# Patient Record
Sex: MALE | Race: WHITE
[De-identification: names, ages, dates, MRNs, and addresses within clinical notes are randomized per-mention and may not be internally consistent; named-entity substitution may affect disease eponyms.]

---

## 2017-08-25 ENCOUNTER — HOSPITAL ENCOUNTER (OUTPATIENT)
Dept: HOSPITAL 53 - M SFHCPLAZ | Age: 47
End: 2017-08-25
Attending: NURSE PRACTITIONER
Payer: COMMERCIAL

## 2017-08-25 DIAGNOSIS — Z00.00: Primary | ICD-10-CM

## 2017-08-25 DIAGNOSIS — Z13.220: ICD-10-CM

## 2017-08-30 ENCOUNTER — HOSPITAL ENCOUNTER (OUTPATIENT)
Dept: HOSPITAL 53 - M LAB | Age: 47
End: 2017-08-30
Attending: NURSE PRACTITIONER
Payer: COMMERCIAL

## 2017-08-30 DIAGNOSIS — M51.26: ICD-10-CM

## 2017-08-30 DIAGNOSIS — M54.42: Primary | ICD-10-CM

## 2017-08-30 LAB
ALBUMIN SERPL BCG-MCNC: 3.6 GM/DL (ref 3.2–5.2)
ALBUMIN/GLOB SERPL: 1.06 {RATIO} (ref 1–1.93)
ALP SERPL-CCNC: 74 U/L (ref 45–117)
ALT SERPL W P-5'-P-CCNC: 31 U/L (ref 12–78)
ANION GAP SERPL CALC-SCNC: 7 MEQ/L (ref 8–16)
AST SERPL-CCNC: 23 U/L (ref 15–37)
BASOPHILS # BLD AUTO: 0 K/MM3 (ref 0–0.2)
BASOPHILS NFR BLD AUTO: 0.4 % (ref 0–1)
BILIRUB SERPL-MCNC: 0.9 MG/DL (ref 0.2–1)
BUN SERPL-MCNC: 19 MG/DL (ref 7–18)
CALCIUM SERPL-MCNC: 9.2 MG/DL (ref 8.5–10.1)
CHLORIDE SERPL-SCNC: 105 MEQ/L (ref 98–107)
CHOLEST SERPL-MCNC: 185 MG/DL (ref ?–200)
CO2 SERPL-SCNC: 29 MEQ/L (ref 21–32)
CREAT SERPL-MCNC: 1.09 MG/DL (ref 0.7–1.3)
EOSINOPHIL # BLD AUTO: 0.1 K/MM3 (ref 0–0.5)
EOSINOPHIL NFR BLD AUTO: 1.8 % (ref 0–3)
ERYTHROCYTE [DISTWIDTH] IN BLOOD BY AUTOMATED COUNT: 11.9 % (ref 11.5–14.5)
GFR SERPL CREATININE-BSD FRML MDRD: > 60 ML/MIN/{1.73_M2} (ref 60–?)
GLUCOSE SERPL-MCNC: 89 MG/DL (ref 70–105)
LARGE UNSTAINED CELL #: 0.1 K/MM3 (ref 0–0.4)
LARGE UNSTAINED CELL %: 2.4 % (ref 0–4)
LYMPHOCYTES # BLD AUTO: 1.5 K/MM3 (ref 1.5–4.5)
LYMPHOCYTES NFR BLD AUTO: 34.1 % (ref 24–44)
MCH RBC QN AUTO: 30.3 PG (ref 27–33)
MCHC RBC AUTO-ENTMCNC: 35.4 G/DL (ref 32–36.5)
MCV RBC AUTO: 85.7 FL (ref 80–96)
MONOCYTES # BLD AUTO: 0.2 K/MM3 (ref 0–0.8)
MONOCYTES NFR BLD AUTO: 5.4 % (ref 0–5)
NEUTROPHILS # BLD AUTO: 2.5 K/MM3 (ref 1.8–7.7)
NEUTROPHILS NFR BLD AUTO: 55.9 % (ref 36–66)
PLATELET # BLD AUTO: 183 K/MM3 (ref 150–450)
POTASSIUM SERPL-SCNC: 4.6 MEQ/L (ref 3.5–5.1)
PROT SERPL-MCNC: 7 GM/DL (ref 6.4–8.2)
SODIUM SERPL-SCNC: 141 MEQ/L (ref 136–145)
TRIGL SERPL-MCNC: 72 MG/DL (ref ?–150)
WBC # BLD AUTO: 4.5 K/MM3 (ref 4–10)

## 2017-08-30 NOTE — REP
Clinical:  Lumbago.

 

Technique:  AP, lateral, bilateral oblique and coned-down views of the

lumbosacral spine.

 

Findings:

Alignment and lordosis maintained.  Minimal multilevel degenerative changes

include subtle anterior spurring and mild associated disc space narrowing with

endplate sclerosis predominately involving L2-3, and L3-4, L4-5.  No acute

fracture / compression injury or subluxation.  Coned-down view demonstrates

hypertrophic facet changes at L3-L5.

 

Impression:

Mild multilevel degenerative changes.

 

 

Signed by

Félix Win MD 08/30/2017 11:07 P

## 2017-09-14 ENCOUNTER — HOSPITAL ENCOUNTER (OUTPATIENT)
Dept: HOSPITAL 53 - M SFHCPLAZ | Age: 47
End: 2017-09-14
Attending: NURSE PRACTITIONER
Payer: COMMERCIAL

## 2017-09-14 DIAGNOSIS — Z11.4: Primary | ICD-10-CM

## 2017-09-14 DIAGNOSIS — R31.21: ICD-10-CM

## 2017-09-20 ENCOUNTER — HOSPITAL ENCOUNTER (OUTPATIENT)
Dept: HOSPITAL 53 - M LAB | Age: 47
End: 2017-09-20
Attending: NURSE PRACTITIONER
Payer: COMMERCIAL

## 2017-09-20 DIAGNOSIS — Z11.4: Primary | ICD-10-CM

## 2017-09-20 DIAGNOSIS — R31.21: ICD-10-CM

## 2018-01-25 ENCOUNTER — HOSPITAL ENCOUNTER (EMERGENCY)
Dept: HOSPITAL 53 - M ED | Age: 48
LOS: 1 days | Discharge: HOME | End: 2018-01-26
Payer: COMMERCIAL

## 2018-01-25 DIAGNOSIS — K52.9: Primary | ICD-10-CM

## 2018-01-25 LAB
ALBUMIN/GLOBULIN RATIO: 1.15 (ref 1–1.93)
ALBUMIN: 3.8 GM/DL (ref 3.2–5.2)
ALKALINE PHOSPHATASE: 73 U/L (ref 45–117)
ALT SERPL W P-5'-P-CCNC: 25 U/L (ref 12–78)
ANION GAP: 6 MEQ/L (ref 8–16)
AST SERPL-CCNC: 19 U/L (ref 7–37)
BASO #: 0 10^3/UL (ref 0–0.2)
BASO %: 0.2 % (ref 0–1)
BILIRUB CONJ SERPL-MCNC: 0.2 MG/DL (ref 0–0.2)
BILIRUBIN,TOTAL: 0.7 MG/DL (ref 0.2–1)
BLOOD UREA NITROGEN: 17 MG/DL (ref 7–18)
CALCIUM LEVEL: 8.8 MG/DL (ref 8.5–10.1)
CARBON DIOXIDE LEVEL: 31 MEQ/L (ref 21–32)
CHLORIDE LEVEL: 104 MEQ/L (ref 98–107)
CREATININE FOR GFR: 1.05 MG/DL (ref 0.7–1.3)
EOS #: 0.1 10^3/UL (ref 0–0.5)
EOSINOPHIL NFR BLD AUTO: 0.6 % (ref 0–3)
GFR SERPL CREATININE-BSD FRML MDRD: > 60 ML/MIN/{1.73_M2} (ref 60–?)
GLUCOSE, FASTING: 97 MG/DL (ref 70–100)
HEMATOCRIT: 41.4 % (ref 42–52)
HEMOGLOBIN: 14.4 G/DL (ref 14–18)
IMMATURE GRANULOCYTE #: 0.1 10^3/UL (ref 0–0)
IMMATURE GRANULOCYTE %: 0.4 % (ref 0–0)
LIPASE: 114 U/L (ref 73–393)
LYMPH #: 1.5 10^3/UL (ref 1.5–4.5)
LYMPH %: 12.3 % (ref 24–44)
MEAN CORPUSCULAR HEMOGLOBIN: 29.4 PG (ref 27–33)
MEAN CORPUSCULAR HGB CONC: 34.8 G/DL (ref 32–36.5)
MEAN CORPUSCULAR VOLUME: 84.7 FL (ref 80–96)
MONO #: 0.9 10^3/UL (ref 0–0.8)
MONO %: 7.1 % (ref 0–5)
NEUTROPHILS #: 9.5 10^3/UL (ref 1.8–7.7)
NEUTROPHILS %: 79.4 % (ref 36–66)
NRBC BLD AUTO-RTO: 0 % (ref 0–0)
PLATELET COUNT, AUTOMATED: 173 10^3/UL (ref 150–450)
POTASSIUM SERUM: 3.9 MEQ/L (ref 3.5–5.1)
RED BLOOD COUNT: 4.89 10^6/UL (ref 4.3–6.1)
RED CELL DISTRIBUTION WIDTH: 11.8 % (ref 11.5–14.5)
SODIUM LEVEL: 141 MEQ/L (ref 136–145)
TOTAL PROTEIN: 7.1 GM/DL (ref 6.4–8.2)
WHITE BLOOD COUNT: 12 10^3/UL (ref 4–10)

## 2018-01-25 RX ADMIN — SODIUM CHLORIDE 1 MLS/HR: 9 INJECTION, SOLUTION INTRAVENOUS at 21:55

## 2018-01-25 RX ADMIN — METOCLOPRAMIDE 1 MG: 5 INJECTION, SOLUTION INTRAMUSCULAR; INTRAVENOUS at 21:55

## 2018-01-31 ENCOUNTER — HOSPITAL ENCOUNTER (OUTPATIENT)
Dept: HOSPITAL 53 - M LAB REF | Age: 48
End: 2018-01-31
Attending: INTERNAL MEDICINE
Payer: COMMERCIAL

## 2018-01-31 ENCOUNTER — HOSPITAL ENCOUNTER (OUTPATIENT)
Dept: HOSPITAL 53 - M LAB | Age: 48
End: 2018-01-31
Attending: INTERNAL MEDICINE
Payer: COMMERCIAL

## 2018-01-31 DIAGNOSIS — R10.31: Primary | ICD-10-CM

## 2018-01-31 PROCEDURE — 82784 ASSAY IGA/IGD/IGG/IGM EACH: CPT

## 2018-02-03 LAB
IGA SERPL-MCNC: 337 MG/DL (ref 90–386)
IGA1 SER-MCNC: 286 MG/DL (ref 73.2–301.2)
IGA2 SER-MCNC: 81.7 MG/DL (ref 13.4–97.9)

## 2018-02-09 ENCOUNTER — HOSPITAL ENCOUNTER (OUTPATIENT)
Dept: HOSPITAL 53 - M LAB REF | Age: 48
End: 2018-02-09
Attending: INTERNAL MEDICINE
Payer: COMMERCIAL

## 2018-02-09 DIAGNOSIS — R10.31: Primary | ICD-10-CM

## 2018-03-01 ENCOUNTER — HOSPITAL ENCOUNTER (OUTPATIENT)
Dept: HOSPITAL 53 - M OPP | Age: 48
Discharge: HOME | End: 2018-03-01
Attending: INTERNAL MEDICINE
Payer: COMMERCIAL

## 2018-03-01 DIAGNOSIS — M06.9: ICD-10-CM

## 2018-03-01 DIAGNOSIS — R19.7: Primary | ICD-10-CM

## 2018-03-01 DIAGNOSIS — Z80.1: ICD-10-CM

## 2018-03-01 DIAGNOSIS — K64.8: ICD-10-CM

## 2018-03-01 DIAGNOSIS — K21.9: ICD-10-CM

## 2018-03-01 DIAGNOSIS — G47.00: ICD-10-CM

## 2018-03-01 DIAGNOSIS — Z79.899: ICD-10-CM

## 2018-03-01 DIAGNOSIS — M19.90: ICD-10-CM

## 2018-03-01 DIAGNOSIS — R06.83: ICD-10-CM

## 2018-03-01 DIAGNOSIS — F43.10: ICD-10-CM

## 2018-03-01 DIAGNOSIS — Z80.41: ICD-10-CM

## 2018-03-01 DIAGNOSIS — K62.5: ICD-10-CM

## 2018-03-01 PROCEDURE — 45380 COLONOSCOPY AND BIOPSY: CPT

## 2018-03-01 RX ADMIN — SODIUM CHLORIDE 1 MLS/HR: 9 INJECTION, SOLUTION INTRAVENOUS at 07:30

## 2018-08-28 ENCOUNTER — HOSPITAL ENCOUNTER (EMERGENCY)
Dept: HOSPITAL 53 - M ED | Age: 48
Discharge: HOME | End: 2018-08-28
Payer: COMMERCIAL

## 2018-08-28 DIAGNOSIS — R10.30: ICD-10-CM

## 2018-08-28 DIAGNOSIS — R53.81: ICD-10-CM

## 2018-08-28 DIAGNOSIS — R53.83: ICD-10-CM

## 2018-08-28 DIAGNOSIS — R42: Primary | ICD-10-CM

## 2018-08-28 DIAGNOSIS — R10.13: ICD-10-CM

## 2018-08-28 LAB
ALBUMIN/GLOBULIN RATIO: 1 (ref 1–1.93)
ALBUMIN: 3.6 GM/DL (ref 3.2–5.2)
ALKALINE PHOSPHATASE: 70 U/L (ref 45–117)
ALT SERPL W P-5'-P-CCNC: 20 U/L (ref 12–78)
ANION GAP: 6 MEQ/L (ref 8–16)
AST SERPL-CCNC: 18 U/L (ref 7–37)
BASO #: 0 10^3/UL (ref 0–0.2)
BASO %: 0.3 % (ref 0–1)
BILIRUBIN,TOTAL: 0.9 MG/DL (ref 0.2–1)
BLOOD UREA NITROGEN: 14 MG/DL (ref 7–18)
CALCIUM LEVEL: 8.6 MG/DL (ref 8.5–10.1)
CARBON DIOXIDE LEVEL: 29 MEQ/L (ref 21–32)
CHLORIDE LEVEL: 108 MEQ/L (ref 98–107)
CREATININE FOR GFR: 0.96 MG/DL (ref 0.7–1.3)
EOS #: 0 10^3/UL (ref 0–0.5)
EOSINOPHIL NFR BLD AUTO: 0.1 % (ref 0–3)
GFR SERPL CREATININE-BSD FRML MDRD: > 60 ML/MIN/{1.73_M2} (ref 60–?)
GLUCOSE, FASTING: 117 MG/DL (ref 70–100)
HEMATOCRIT: 46.7 % (ref 42–52)
HEMOGLOBIN: 16.1 G/DL (ref 13.5–17.5)
IMMATURE GRANULOCYTE %: 0.3 % (ref 0–3)
LIPASE: 86 U/L (ref 73–393)
LYMPH #: 1.6 10^3/UL (ref 1.5–4.5)
LYMPH %: 12.3 % (ref 24–44)
MEAN CORPUSCULAR HEMOGLOBIN: 29.5 PG (ref 27–33)
MEAN CORPUSCULAR HGB CONC: 34.5 G/DL (ref 32–36.5)
MEAN CORPUSCULAR VOLUME: 85.7 FL (ref 80–96)
MONO #: 0.6 10^3/UL (ref 0–0.8)
MONO %: 4.9 % (ref 0–5)
NEUTROPHILS #: 10.4 10^3/UL (ref 1.8–7.7)
NEUTROPHILS %: 82.1 % (ref 36–66)
NRBC BLD AUTO-RTO: 0 % (ref 0–0)
PLATELET COUNT, AUTOMATED: 187 10^3/UL (ref 150–450)
POTASSIUM SERUM: 4.4 MEQ/L (ref 3.5–5.1)
RED BLOOD COUNT: 5.45 10^6/UL (ref 4.3–6.1)
RED CELL DISTRIBUTION WIDTH: 12.1 % (ref 11.5–14.5)
SODIUM LEVEL: 143 MEQ/L (ref 136–145)
TOTAL PROTEIN: 7.2 GM/DL (ref 6.4–8.2)
WHITE BLOOD COUNT: 12.6 10^3/UL (ref 4–10)

## 2018-08-28 RX ADMIN — DEXTROSE MONOHYDRATE 1 MG: 50 INJECTION, SOLUTION INTRAVENOUS at 11:39

## 2018-08-28 RX ADMIN — PROMETHAZINE HYDROCHLORIDE 1 MG: 25 TABLET ORAL at 13:30

## 2018-08-28 RX ADMIN — ONDANSETRON 1 MG: 2 INJECTION INTRAMUSCULAR; INTRAVENOUS at 11:39

## 2018-08-28 RX ADMIN — SODIUM CHLORIDE 1 MLS/HR: 9 INJECTION, SOLUTION INTRAVENOUS at 11:39

## 2018-08-28 RX ADMIN — MECLIZINE HYDROCHLORIDE 1 MG: 25 TABLET ORAL at 13:30

## 2018-12-03 ENCOUNTER — HOSPITAL ENCOUNTER (OUTPATIENT)
Dept: HOSPITAL 53 - M LAB REF | Age: 48
End: 2018-12-03
Attending: NURSE PRACTITIONER
Payer: COMMERCIAL

## 2018-12-03 DIAGNOSIS — D72.829: Primary | ICD-10-CM

## 2018-12-03 LAB
BASO #: 0 10^3/UL (ref 0–0.2)
BASO %: 0.5 % (ref 0–1)
EOS #: 0.2 10^3/UL (ref 0–0.5)
EOSINOPHIL NFR BLD AUTO: 2.8 % (ref 0–3)
HEMATOCRIT: 45.5 % (ref 42–52)
HEMOGLOBIN: 15.7 G/DL (ref 13.5–17.5)
IMMATURE GRANULOCYTE %: 0.3 % (ref 0–3)
LYMPH #: 1.9 10^3/UL (ref 1.5–4.5)
LYMPH %: 29.8 % (ref 24–44)
MEAN CORPUSCULAR HEMOGLOBIN: 29.1 PG (ref 27–33)
MEAN CORPUSCULAR HGB CONC: 34.5 G/DL (ref 32–36.5)
MEAN CORPUSCULAR VOLUME: 84.4 FL (ref 80–96)
MONO #: 0.6 10^3/UL (ref 0–0.8)
MONO %: 8.5 % (ref 0–5)
NEUTROPHILS #: 3.8 10^3/UL (ref 1.8–7.7)
NEUTROPHILS %: 58.1 % (ref 36–66)
NRBC BLD AUTO-RTO: 0 % (ref 0–0)
PLATELET COUNT, AUTOMATED: 199 10^3/UL (ref 150–450)
REASON FOR REVIEW: (no result)
RED BLOOD COUNT: 5.39 10^6/UL (ref 4.3–6.1)
RED CELL DISTRIBUTION WIDTH: 12.3 % (ref 11.5–14.5)
SOURCE: (no result)
WHITE BLOOD COUNT: 6.5 10^3/UL (ref 4–10)

## 2018-12-03 PROCEDURE — 85025 COMPLETE CBC W/AUTO DIFF WBC: CPT

## 2018-12-19 ENCOUNTER — HOSPITAL ENCOUNTER (OUTPATIENT)
Dept: HOSPITAL 53 - M RAD | Age: 48
End: 2018-12-19
Attending: NURSE PRACTITIONER
Payer: COMMERCIAL

## 2018-12-19 DIAGNOSIS — Z87.821: Primary | ICD-10-CM

## 2018-12-19 NOTE — REP
Clinical:  Evaluate for foreign body.

 

Technique:  AP, Moscoso, Godoy, and bilateral lateral views of the skull.

 

Findings:  Dental amalgam noted.  No obvious foreign body appreciated.  No acute

fracture dislocation.  Sinuses are well aerated and clear.

 

Impression:

Normal skull series.  No obvious significant foreign body.

 

 

Electronically Signed by

Félix Win MD 12/19/2018 07:05 P

## 2019-01-11 ENCOUNTER — HOSPITAL ENCOUNTER (OUTPATIENT)
Dept: HOSPITAL 53 - M SDC | Age: 49
Discharge: HOME | End: 2019-01-11
Attending: SURGERY
Payer: MEDICAID

## 2019-01-11 VITALS — SYSTOLIC BLOOD PRESSURE: 110 MMHG | DIASTOLIC BLOOD PRESSURE: 80 MMHG

## 2019-01-11 VITALS — HEIGHT: 65 IN | WEIGHT: 172.8 LBS | BODY MASS INDEX: 28.79 KG/M2

## 2019-01-11 DIAGNOSIS — K38.9: Primary | ICD-10-CM

## 2019-01-11 DIAGNOSIS — K21.9: ICD-10-CM

## 2019-01-11 DIAGNOSIS — Z79.899: ICD-10-CM

## 2019-01-11 DIAGNOSIS — F43.10: ICD-10-CM

## 2019-01-11 DIAGNOSIS — M54.5: ICD-10-CM

## 2019-01-11 LAB
BUN SERPL-MCNC: 16 MG/DL (ref 7–18)
CALCIUM SERPL-MCNC: 8.9 MG/DL (ref 8.5–10.1)
CHLORIDE SERPL-SCNC: 109 MEQ/L (ref 98–107)
CO2 SERPL-SCNC: 26 MEQ/L (ref 21–32)
CREAT SERPL-MCNC: 0.9 MG/DL (ref 0.7–1.3)
GFR SERPL CREATININE-BSD FRML MDRD: > 60 ML/MIN/{1.73_M2} (ref 60–?)
GLUCOSE SERPL-MCNC: 101 MG/DL (ref 70–100)
HCT VFR BLD AUTO: 44.6 % (ref 42–52)
HGB BLD-MCNC: 15.3 G/DL (ref 13.5–17.5)
MCH RBC QN AUTO: 29.3 PG (ref 27–33)
MCHC RBC AUTO-ENTMCNC: 34.3 G/DL (ref 32–36.5)
MCV RBC AUTO: 85.4 FL (ref 80–96)
PLATELET # BLD AUTO: 197 10^3/UL (ref 150–450)
POTASSIUM SERPL-SCNC: 4.4 MEQ/L (ref 3.5–5.1)
RBC # BLD AUTO: 5.22 10^6/UL (ref 4.3–6.1)
SODIUM SERPL-SCNC: 141 MEQ/L (ref 136–145)
WBC # BLD AUTO: 5.4 10^3/UL (ref 4–10)

## 2019-01-11 PROCEDURE — 85027 COMPLETE CBC AUTOMATED: CPT

## 2019-01-11 PROCEDURE — 36415 COLL VENOUS BLD VENIPUNCTURE: CPT

## 2019-01-11 PROCEDURE — 44970 LAPAROSCOPY APPENDECTOMY: CPT

## 2019-01-11 PROCEDURE — 80048 BASIC METABOLIC PNL TOTAL CA: CPT

## 2019-01-11 PROCEDURE — 88302 TISSUE EXAM BY PATHOLOGIST: CPT

## 2019-01-11 NOTE — RO
DATE OF PROCEDURE:  01/11/2019

 

PREOPERATIVE DIAGNOSIS:  Right lower quadrant pain.

 

POSTOPERATIVE DIAGNOSIS:  Right lower quadrant pain.

 

PROCEDURE:  Diagnostic laparoscopy with laparoscopic appendectomy.

 

SURGEON:  Julian Dee DO

 

ASSISTANT:  None.

 

ANESTHESIA:  General:

 

ESTIMATED BLOOD LOSS:  2 mL.

 

COMPLICATIONS:  None.

 

INDICATIONS FOR PROCEDURE:  The patient 48-year-old male who presents with

persistent right lower quadrant pain for the past 3 years.  Pain is right over

McBurney's point.  CT scan shows that he does have an appendicolith in the

appendix.  He also was a retrocecal appendix.  This could be contributing to his

pain.  Therefore plan was to proceed with a diagnostic surgery as well as take

out the appendix at the same time.  Risks and the procedure not limited to, but

including bleeding, infection, hernia formation, damage surrounding structure,

and need for further surgery were discussed in detail with the patient.  Informed

was obtained and the procedure was planned.

 

PROCEDURE:  The patient brought back to operating room 7.  After sufficient

sedation, the abdomen was sterilely prepped and draped.  Next, a time out was

done to confirm proper patient and proper procedure.  Following that, a 5 mm

incision made in left lower quadrant Veress needle was inserted and the abdomen

was insufflated to 15 mmHg.

 

Next, Veress needle was removed and a 5 mm OptiView port was used to gain access

to the abdomen.  Once the abdomen was entered an 8 mm port was placed umbilically

in the midline and another 5 mm port suprapubically in the midline.  The cecum

was identified with a retrocecal and retroperitoneal appendix that extended

superiorly up towards the liver.  This was carefully dissected free using the

ENSEAL starting from the base and working up towards the tip.  Once it was

completely dissected free, the appendix was ligated with two PDS Endoloops and

the amputated using the ENSEAL.  The appendix was then brought out through the

supraumbilical port site in a 5 mm EndoCatch bag.  The abdomen was then cleaned

and dried.  Steri-Strips, 4x4 and tape were applied, thus ending the procedure.

## 2019-08-21 ENCOUNTER — HOSPITAL ENCOUNTER (EMERGENCY)
Dept: HOSPITAL 53 - M ED | Age: 49
Discharge: HOME | End: 2019-08-21
Payer: COMMERCIAL

## 2019-08-21 VITALS — HEIGHT: 65 IN | WEIGHT: 175.38 LBS | BODY MASS INDEX: 29.22 KG/M2

## 2019-08-21 VITALS — SYSTOLIC BLOOD PRESSURE: 142 MMHG | DIASTOLIC BLOOD PRESSURE: 68 MMHG

## 2019-08-21 DIAGNOSIS — M19.90: ICD-10-CM

## 2019-08-21 DIAGNOSIS — F41.9: Primary | ICD-10-CM

## 2019-08-21 DIAGNOSIS — F43.10: ICD-10-CM

## 2019-08-21 DIAGNOSIS — K21.9: ICD-10-CM

## 2019-08-21 LAB
ALBUMIN SERPL BCG-MCNC: 3.8 GM/DL (ref 3.2–5.2)
ALT SERPL W P-5'-P-CCNC: 24 U/L (ref 12–78)
AMPHETAMINES UR QL SCN: NEGATIVE
APAP SERPL-MCNC: < 2 UG/ML (ref 10–30)
BARBITURATES UR QL SCN: NEGATIVE
BENZODIAZ UR QL SCN: NEGATIVE
BILIRUB CONJ SERPL-MCNC: 0.2 MG/DL (ref 0–0.2)
BILIRUB SERPL-MCNC: 1 MG/DL (ref 0.2–1)
BUN SERPL-MCNC: 14 MG/DL (ref 7–18)
BZE UR QL SCN: NEGATIVE
CALCIUM SERPL-MCNC: 9.4 MG/DL (ref 8.5–10.1)
CANNABINOIDS UR QL SCN: NEGATIVE
CHLORIDE SERPL-SCNC: 106 MEQ/L (ref 98–107)
CO2 SERPL-SCNC: 28 MEQ/L (ref 21–32)
CREAT SERPL-MCNC: 1.18 MG/DL (ref 0.7–1.3)
ETHANOL SERPL-MCNC: 0 % (ref 0–0.01)
GFR SERPL CREATININE-BSD FRML MDRD: > 60 ML/MIN/{1.73_M2} (ref 60–?)
GLUCOSE SERPL-MCNC: 94 MG/DL (ref 70–100)
HCT VFR BLD AUTO: 44.4 % (ref 42–52)
HGB BLD-MCNC: 15.3 G/DL (ref 13.5–17.5)
MCH RBC QN AUTO: 29.9 PG (ref 27–33)
MCHC RBC AUTO-ENTMCNC: 34.5 G/DL (ref 32–36.5)
MCV RBC AUTO: 86.7 FL (ref 80–96)
METHADONE UR QL SCN: NEGATIVE
OPIATES UR QL SCN: NEGATIVE
PCP UR QL SCN: NEGATIVE
PLATELET # BLD AUTO: 173 10^3/UL (ref 150–450)
POTASSIUM SERPL-SCNC: 4.1 MEQ/L (ref 3.5–5.1)
PROT SERPL-MCNC: 7.4 GM/DL (ref 6.4–8.2)
RBC # BLD AUTO: 5.12 10^6/UL (ref 4.3–6.1)
SALICYLATES SERPL-MCNC: < 1.7 MG/DL (ref 5–30)
SODIUM SERPL-SCNC: 138 MEQ/L (ref 136–145)
TSH SERPL DL<=0.005 MIU/L-ACNC: 1.91 UIU/ML (ref 0.36–3.74)
WBC # BLD AUTO: 4.8 10^3/UL (ref 4–10)

## 2019-08-21 PROCEDURE — 85027 COMPLETE CBC AUTOMATED: CPT

## 2019-08-21 PROCEDURE — 80048 BASIC METABOLIC PNL TOTAL CA: CPT

## 2019-08-21 PROCEDURE — 84443 ASSAY THYROID STIM HORMONE: CPT

## 2019-08-21 PROCEDURE — 80076 HEPATIC FUNCTION PANEL: CPT

## 2019-08-21 PROCEDURE — 70450 CT HEAD/BRAIN W/O DYE: CPT

## 2019-08-21 PROCEDURE — 99284 EMERGENCY DEPT VISIT MOD MDM: CPT

## 2019-08-21 PROCEDURE — 80307 DRUG TEST PRSMV CHEM ANLYZR: CPT

## 2019-08-21 NOTE — REP
REASON:  Altered mental status.

 

PRIORS:  None.

 

TECHNIQUE:  4.5 mm contiguous transaxial sections were obtained from the skull

base to the cerebral convexities with thin cuts through the posterior fossa

without the administration of intravenous contrast.

 

FINDINGS:  The ventricles and sulci are consistent with the patient's age.  There

are no extra-axial fluid collections.  There is no mass effect.  The deep

cerebral white matter is consistent with the patient's age.

 

The orbital and petrous structures , cerebellopontine angles, and posterior fossa

are unremarkable.

 

The sella turcica, cavernous, and paracavernous structures are essentially

unremarkable.

 

The visualized portions of the paranasal sinuses and mastoid air cells are

clear.

 

Images of the skull base show no gross abnormality.

 

IMPRESSION:

 

Essentially unremarkable CT examination of the brain.

 

 

Electronically Signed by

Nathanael Valerio DO 08/21/2019 05:12 P

## 2019-10-30 ENCOUNTER — HOSPITAL ENCOUNTER (EMERGENCY)
Dept: HOSPITAL 53 - M ED | Age: 49
Discharge: HOME | End: 2019-10-30
Payer: COMMERCIAL

## 2019-10-30 ENCOUNTER — HOSPITAL ENCOUNTER (OUTPATIENT)
Dept: HOSPITAL 53 - M RAD | Age: 49
End: 2019-10-30
Attending: FAMILY MEDICINE
Payer: COMMERCIAL

## 2019-10-30 VITALS
BODY MASS INDEX: 29.72 KG/M2 | HEIGHT: 65 IN | WEIGHT: 178.35 LBS | SYSTOLIC BLOOD PRESSURE: 126 MMHG | DIASTOLIC BLOOD PRESSURE: 62 MMHG

## 2019-10-30 DIAGNOSIS — M76.72: ICD-10-CM

## 2019-10-30 DIAGNOSIS — M77.32: Primary | ICD-10-CM

## 2019-10-30 DIAGNOSIS — H10.12: Primary | ICD-10-CM

## 2019-10-30 DIAGNOSIS — F43.10: ICD-10-CM

## 2019-10-30 NOTE — REP
Left foot series:  Four views.

 

History:  Pain.

 

Findings:  Four views of the left foot demonstrate normal bones, joints, and soft

tissues.  No fracture or subluxation is seen.  There is a tiny plantar heel

spur.

 

Impression:

 

Tiny plantar heel spur.  Otherwise negative left foot radiographs.

 

 

Electronically Signed by

Henry Snowden MD 10/30/2019 12:01 P

## 2019-10-30 NOTE — REP
Left os calcis:  Two views.

 

History:  Heel pain.

 

Findings:  There is minimal Achilles and plantar calcaneal spurring.  No erosive

changes seen.  Bones, joints and soft tissues are otherwise unremarkable.

 

Impression:

 

Tiny Achilles and plantar calcaneal spurs.

 

 

Electronically Signed by

Henry Snowden MD 10/30/2019 12:01 P

## 2020-08-07 ENCOUNTER — HOSPITAL ENCOUNTER (INPATIENT)
Dept: HOSPITAL 53 - M ED | Age: 50
LOS: 2 days | Discharge: HOME | DRG: 760 | End: 2020-08-09
Attending: PSYCHIATRY & NEUROLOGY | Admitting: PSYCHIATRY & NEUROLOGY
Payer: MEDICAID

## 2020-08-07 DIAGNOSIS — F22: Primary | ICD-10-CM

## 2020-09-20 LAB
HCT VFR BLD AUTO: 45.3 % (ref 42–52)
HGB BLD-MCNC: 15.5 G/DL (ref 13.5–17.5)
MCH RBC QN AUTO: 29.6 PG (ref 27–33)
MCHC RBC AUTO-ENTMCNC: 34.2 G/DL (ref 32–36.5)
MCV RBC AUTO: 86.6 FL (ref 80–96)
PLATELET # BLD AUTO: 192 10^3/UL (ref 150–450)
RBC # BLD AUTO: 5.23 10^6/UL (ref 4.3–6.1)
WBC # BLD AUTO: 5 10^3/UL (ref 4–10)

## 2020-10-26 LAB
ALBUMIN SERPL BCG-MCNC: 3.9 GM/DL (ref 3.2–5.2)
ALT SERPL W P-5'-P-CCNC: 21 U/L (ref 12–78)
AMPHETAMINES UR QL SCN: NEGATIVE
APAP SERPL-MCNC: < 2 UG/ML (ref 10–30)
BARBITURATES UR QL SCN: NEGATIVE
BENZODIAZ UR QL SCN: NEGATIVE
BILIRUB SERPL-MCNC: 0.4 MG/DL (ref 0.2–1)
BUN SERPL-MCNC: 13 MG/DL (ref 7–18)
BZE UR QL SCN: NEGATIVE
CALCIUM SERPL-MCNC: 9.6 MG/DL (ref 8.5–10.1)
CANNABINOIDS UR QL SCN: NEGATIVE
CHLORIDE SERPL-SCNC: 109 MEQ/L (ref 98–107)
CO2 SERPL-SCNC: 31 MEQ/L (ref 21–32)
CREAT SERPL-MCNC: 0.91 MG/DL (ref 0.7–1.3)
ETHANOL SERPL-MCNC: < 0.003 % (ref 0–0.01)
GFR SERPL CREATININE-BSD FRML MDRD: > 60 ML/MIN/{1.73_M2} (ref 60–?)
GLUCOSE SERPL-MCNC: 93 MG/DL (ref 70–100)
METHADONE UR QL SCN: NEGATIVE
OPIATES UR QL SCN: NEGATIVE
PCP UR QL SCN: NEGATIVE
POTASSIUM SERPL-SCNC: 4.3 MEQ/L (ref 3.5–5.1)
PROT SERPL-MCNC: 7 GM/DL (ref 6.4–8.2)
SALICYLATES SERPL-MCNC: (no result) MG/DL (ref 5–30)
SODIUM SERPL-SCNC: 140 MEQ/L (ref 136–145)

## 2020-11-03 NOTE — MHDSPDOC
[facility] Discharge Summary


Discharge Summary


DATE OF ADMISSION: Aug 7, 2020 at 20:01 


DATE OF DISCHARGE: Aug 9, 2020 at 14:50


DISCHARGE DIAGNOSES:





F22 Delusional disorders





CONSULTANTS INVOLVED:[ None (basic hospitalist screening)]





REASON FOR ADMISSION & TREATMENT AND PROGRESS ON THE UNIT : 





Patient was admitted to the inpatient mental health unit after reportedly 

presenting with quite severe symptoms of reported psychosis. Patient did 

generally well in the unit, with no observe problems. He was observed for 48 

hours where it appeared that his delusions were fixed and generally did not 

extend to other behaviors. He was nearly amenable, though irritable and at times

did not engage in any concerning behavior.











MEDICATIONS:





He declined medications.





DISCHARGE ASSESSMENT:[improved]








Legal status considerations:





The patient at the time of discharge did not meet criteria for involuntary 

admission/extension due to having a baseline mental status exam, likely in my 

opinion baseline insight into the situation, They are engaged in the discharge 

process, as well as being friendly and amenable in behavioral control and 

havent been engaging in any observed concerning behavior or ideation recently. 

They decline voluntary extension/admission at this time and must be discharged 

in good ray, as Im unable to make a case for holding the patient against 

their will. They may have historical risk factors of admissions and other 

interactions with psychiatry however, those are not modifiable from a clinical 

perspective. The patient will need to be discharged in good ray.





MENTAL STATUS EXAMINATION ON DISCHARGE: 








Behavior: does not engage in any frightening or difficult to control behavior.











Affect: pleasant and cooperative.











Thought Form: logical other than the reported delusional ideation. linear and 

logical.











Thought Content: denied any suicidal or homicidal ideation through the entirety 

of his admission.











Judgement: Intact as evidenced by decision making in the recent past.











Insight: Good insight into symptoms and treatment options.








PLAN/FOLLOWUP ARRANGEMENTS: Follow up appointments made (PCP and MH in 5 days of

 D/C date) and safety plan completed. 





Safety Planning aspects completed prior to discharge











[RN reviewed crisis hotline information and other aspects to empower patient to 

access care in interim before next appointment.]











The amount of time spent in the coordination of care for this patient was 

approximately 30 minutes.





Medications


Scheduled


Cromolyn Sodium (Cromolyn Sodium) 4% 10ML Drops, 1 DROP OS QID for 30 Days, #10


Loratadine (Claritin) 10 Mg Capsule, 1 CAP PO DAILY for allergy symptoms for 30 

Days, #30





Allergies


Coded Allergies:  


     No Known Allergies (Verified , 1/11/19)











MATTHEW BATISTA DO               Nov 3, 2020 11:25

## 2021-05-11 ENCOUNTER — HOSPITAL ENCOUNTER (EMERGENCY)
Dept: HOSPITAL 53 - M ED | Age: 51
Discharge: HOME | End: 2021-05-11
Payer: COMMERCIAL

## 2021-05-11 VITALS
SYSTOLIC BLOOD PRESSURE: 122 MMHG | BODY MASS INDEX: 30.05 KG/M2 | DIASTOLIC BLOOD PRESSURE: 73 MMHG | HEIGHT: 65 IN | WEIGHT: 180.38 LBS

## 2021-05-11 DIAGNOSIS — S81.811A: Primary | ICD-10-CM
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
## 2021-05-11 DIAGNOSIS — F41.9: ICD-10-CM
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
## 2021-05-30 ENCOUNTER — HOSPITAL ENCOUNTER (EMERGENCY)
Dept: HOSPITAL 53 - M ED | Age: 51
Discharge: HOME | End: 2021-05-30
Payer: COMMERCIAL

## 2021-05-30 VITALS — SYSTOLIC BLOOD PRESSURE: 115 MMHG | DIASTOLIC BLOOD PRESSURE: 76 MMHG

## 2021-05-30 VITALS — HEIGHT: 65 IN | BODY MASS INDEX: 30.05 KG/M2 | WEIGHT: 180.38 LBS

## 2021-05-30 DIAGNOSIS — S81.811D: ICD-10-CM

## 2021-05-30 DIAGNOSIS — L08.9: Primary | ICD-10-CM
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
## 2021-05-30 DIAGNOSIS — Z79.899: ICD-10-CM

## 2021-05-30 PROCEDURE — 99283 EMERGENCY DEPT VISIT LOW MDM: CPT

## 2021-05-30 PROCEDURE — 96372 THER/PROPH/DIAG INJ SC/IM: CPT
